# Patient Record
Sex: MALE | Race: WHITE | NOT HISPANIC OR LATINO | Employment: FULL TIME | URBAN - METROPOLITAN AREA
[De-identification: names, ages, dates, MRNs, and addresses within clinical notes are randomized per-mention and may not be internally consistent; named-entity substitution may affect disease eponyms.]

---

## 2017-12-07 DIAGNOSIS — I25.10 CORONARY ARTERY DISEASE, ANGINA PRESENCE UNSPECIFIED, UNSPECIFIED VESSEL OR LESION TYPE, UNSPECIFIED WHETHER NATIVE OR TRANSPLANTED HEART: Primary | ICD-10-CM

## 2017-12-07 DIAGNOSIS — E78.5 DYSLIPIDEMIA: ICD-10-CM

## 2017-12-07 DIAGNOSIS — I21.21 ST ELEVATION MYOCARDIAL INFARCTION INVOLVING LEFT CIRCUMFLEX CORONARY ARTERY: ICD-10-CM

## 2018-02-06 ENCOUNTER — OFFICE VISIT (OUTPATIENT)
Dept: CARDIOLOGY | Facility: CLINIC | Age: 53
End: 2018-02-06
Payer: COMMERCIAL

## 2018-02-06 ENCOUNTER — LAB VISIT (OUTPATIENT)
Dept: LAB | Facility: HOSPITAL | Age: 53
End: 2018-02-06
Attending: INTERNAL MEDICINE
Payer: COMMERCIAL

## 2018-02-06 ENCOUNTER — HOSPITAL ENCOUNTER (OUTPATIENT)
Dept: CARDIOLOGY | Facility: CLINIC | Age: 53
Discharge: HOME | End: 2018-02-06
Attending: INTERNAL MEDICINE
Payer: COMMERCIAL

## 2018-02-06 VITALS
HEART RATE: 59 BPM | BODY MASS INDEX: 23.9 KG/M2 | DIASTOLIC BLOOD PRESSURE: 66 MMHG | HEIGHT: 69 IN | SYSTOLIC BLOOD PRESSURE: 118 MMHG | WEIGHT: 161.38 LBS | OXYGEN SATURATION: 100 %

## 2018-02-06 DIAGNOSIS — I25.10 CORONARY ARTERY DISEASE, ANGINA PRESENCE UNSPECIFIED, UNSPECIFIED VESSEL OR LESION TYPE, UNSPECIFIED WHETHER NATIVE OR TRANSPLANTED HEART: ICD-10-CM

## 2018-02-06 DIAGNOSIS — I21.21 ST ELEVATION MYOCARDIAL INFARCTION INVOLVING LEFT CIRCUMFLEX CORONARY ARTERY: ICD-10-CM

## 2018-02-06 DIAGNOSIS — E78.5 DYSLIPIDEMIA: ICD-10-CM

## 2018-02-06 DIAGNOSIS — I25.118 CORONARY ARTERY DISEASE OF NATIVE ARTERY OF NATIVE HEART WITH STABLE ANGINA PECTORIS: ICD-10-CM

## 2018-02-06 DIAGNOSIS — K21.9 GASTROESOPHAGEAL REFLUX DISEASE WITHOUT ESOPHAGITIS: ICD-10-CM

## 2018-02-06 LAB
CHOLEST SERPL-MCNC: 132 MG/DL
CHOLEST/HDLC SERPL: 3.8 {RATIO}
DIASTOLIC DYSFUNCTION: NO
HDLC SERPL-MCNC: 35 MG/DL
HDLC SERPL: 26.5 %
LDLC SERPL CALC-MCNC: 86.2 MG/DL
MITRAL VALVE REGURGITATION: NORMAL
NONHDLC SERPL-MCNC: 97 MG/DL
RETIRED EF AND QEF - SEE NOTES: 63 (ref 55–65)
TRIGL SERPL-MCNC: 54 MG/DL

## 2018-02-06 PROCEDURE — 99999 PR PBB SHADOW E&M-EST. PATIENT-LVL III: CPT | Mod: PBBFAC,,, | Performed by: INTERNAL MEDICINE

## 2018-02-06 PROCEDURE — 80061 LIPID PANEL: CPT

## 2018-02-06 PROCEDURE — 99499 UNLISTED E&M SERVICE: CPT | Mod: S$GLB,,, | Performed by: INTERNAL MEDICINE

## 2018-02-06 PROCEDURE — 93306 TTE W/DOPPLER COMPLETE: CPT | Mod: S$GLB,,, | Performed by: INTERNAL MEDICINE

## 2018-02-06 PROCEDURE — 36415 COLL VENOUS BLD VENIPUNCTURE: CPT

## 2018-02-06 RX ORDER — OSELTAMIVIR PHOSPHATE 75 MG/1
CAPSULE ORAL
COMMUNITY
Start: 2017-12-21 | End: 2018-08-07

## 2018-02-06 RX ORDER — CLOPIDOGREL BISULFATE 75 MG/1
TABLET ORAL
COMMUNITY
Start: 2017-11-24

## 2018-02-06 RX ORDER — NAPROXEN SODIUM 220 MG/1
81 TABLET, FILM COATED ORAL
COMMUNITY
Start: 2017-11-25

## 2018-02-06 RX ORDER — LISINOPRIL 5 MG/1
TABLET ORAL
COMMUNITY
Start: 2017-11-24

## 2018-02-06 RX ORDER — ATORVASTATIN CALCIUM 80 MG/1
80 TABLET, FILM COATED ORAL
COMMUNITY
Start: 2017-11-25 | End: 2023-12-22

## 2018-02-06 RX ORDER — PANTOPRAZOLE SODIUM 40 MG/1
TABLET, DELAYED RELEASE ORAL
COMMUNITY
Start: 2017-11-24

## 2018-02-06 RX ORDER — ATORVASTATIN CALCIUM 80 MG/1
TABLET, FILM COATED ORAL
COMMUNITY
Start: 2017-11-24 | End: 2018-02-06 | Stop reason: SDUPTHER

## 2018-02-06 NOTE — PROGRESS NOTES
Subjective:    Patient ID:  Rafal Recinos is a 52 y.o. male who presents for follow-up of CAD.      Self Referral    HPI     53 yo HTN, Dyslipidemia, CAD s/p  STEMI (11/22/17) s/p pPCI of mLCx (3.5 x 16 CHRISTINA) [facility], [place] here for follow up.     He reports no symptoms and denies angina/PND/orthopnea or plapitations. No syncope reported. His functional status is > 7 METS (He walks in maintenance for [facility]).      SHx: smokeless Tobacco, No ETOH abuse    FHx: No history of premature CAD.      TTE 2/6/18:  CONCLUSIONS     1 - Normal left ventricular systolic function (EF 60-65%).     2 - No wall motion abnormalities.     3 - Normal left ventricular diastolic function.     4 - Normal right ventricular systolic function .     5 - Trivial mitral regurgitation.     6 - Trivial to mild pulmonic regurgitation    Review of Systems   Constitution: Negative for chills, decreased appetite, fever, weakness, night sweats, weight gain and weight loss.   HENT: Negative for congestion, hoarse voice, stridor and tinnitus.    Eyes: Negative for blurred vision, pain and visual disturbance.   Cardiovascular: Negative for chest pain, claudication, dyspnea on exertion, irregular heartbeat, leg swelling, near-syncope, orthopnea, palpitations, paroxysmal nocturnal dyspnea and syncope.   Respiratory: Negative for cough, hemoptysis, shortness of breath, snoring and wheezing.    Endocrine: Negative for cold intolerance, heat intolerance and polyuria.   Hematologic/Lymphatic: Negative for bleeding problem. Does not bruise/bleed easily.   Skin: Negative for color change and rash.   Musculoskeletal: Negative for arthritis, back pain, joint pain, muscle cramps, myalgias and stiffness.   Gastrointestinal: Negative for abdominal pain, anorexia, constipation, diarrhea, dysphagia, heartburn, hemorrhoids, melena, nausea and vomiting.   Genitourinary: Negative for frequency, hematuria, hesitancy, nocturia and urgency.  "  Neurological: Negative for difficulty with concentration, dizziness, focal weakness, headaches, light-headedness, numbness, seizures, tremors and vertigo.   Psychiatric/Behavioral: Negative for altered mental status and depression. The patient does not have insomnia.    Allergic/Immunologic: Negative for hives.        Objective:    Physical Exam   Constitutional: He is oriented to person, place, and time. He appears well-developed and well-nourished.   HENT:   Head: Normocephalic and atraumatic.   Eyes: Conjunctivae are normal. Pupils are equal, round, and reactive to light.   Neck: Normal range of motion. No JVD present. No tracheal deviation present. No thyromegaly present.   Cardiovascular: Regular rhythm, S1 normal, S2 normal, normal heart sounds, intact distal pulses and normal pulses.   No extrasystoles are present. Exam reveals no S3.    No murmur heard.  Pulses:       Carotid pulses are 2+ on the right side, and 2+ on the left side.       Radial pulses are 2+ on the right side, and 2+ on the left side.        Femoral pulses are 2+ on the right side, and 2+ on the left side.       Dorsalis pedis pulses are 2+ on the right side, and 2+ on the left side.        Posterior tibial pulses are 2+ on the right side, and 2+ on the left side.   Pulmonary/Chest: Effort normal and breath sounds normal. No stridor. No respiratory distress. He has no wheezes. He has no rales.   Abdominal: Soft. Bowel sounds are normal. He exhibits no distension. There is no tenderness.   Musculoskeletal: Normal range of motion. He exhibits no edema or tenderness.   Neurological: He is alert and oriented to person, place, and time.   Skin: Skin is warm and dry. He is not diaphoretic. No erythema.   Psychiatric: He has a normal mood and affect.       /66 (BP Location: Right arm, Patient Position: Sitting, BP Method: Large (Automatic))   Pulse (!) 59   Ht 5' 9" (1.753 m)   Wt 73.2 kg (161 lb 6 oz)   SpO2 100%   BMI 23.83 kg/m² "     Assessment:       1. Coronary artery disease of native artery of native heart without angina    2. Dyslipidemia    3. Gastroesophageal reflux disease without esophagitis      Plan:     Cath films reviewed. Non significant mLAD/distal LCx disease. Patient symptomatic  Would recommend to continue ASA/Plavix for atleast 1 year considering ACS. Continue with high intensity statin therapy.  Echo reviewed which demonstrates normal LVEF. Diet and Exercise modification.   Counseling provided to quit smokeless tobacco.  6 months follow up with Dr. Sarah to reasses/re-evaluate for stress test.     Denny Tovar MD  PGY-7  Interventional Cardiology     I have personally taken the history and examined this patient. I have discussed and agree with the resident's findings and plan as documented in the resident's note.  Bahman Sarah

## 2018-07-31 DIAGNOSIS — E78.5 DYSLIPIDEMIA: ICD-10-CM

## 2018-07-31 DIAGNOSIS — I25.10 CORONARY ARTERY DISEASE, ANGINA PRESENCE UNSPECIFIED, UNSPECIFIED VESSEL OR LESION TYPE, UNSPECIFIED WHETHER NATIVE OR TRANSPLANTED HEART: Primary | ICD-10-CM

## 2018-08-07 ENCOUNTER — HOSPITAL ENCOUNTER (OUTPATIENT)
Dept: CARDIOLOGY | Facility: CLINIC | Age: 53
Discharge: HOME | End: 2018-08-07
Attending: INTERNAL MEDICINE
Payer: COMMERCIAL

## 2018-08-07 ENCOUNTER — OFFICE VISIT (OUTPATIENT)
Dept: CARDIOLOGY | Facility: CLINIC | Age: 53
End: 2018-08-07
Payer: COMMERCIAL

## 2018-08-07 VITALS
HEART RATE: 66 BPM | WEIGHT: 162.94 LBS | SYSTOLIC BLOOD PRESSURE: 133 MMHG | BODY MASS INDEX: 24.13 KG/M2 | OXYGEN SATURATION: 98 % | DIASTOLIC BLOOD PRESSURE: 79 MMHG | HEIGHT: 69 IN

## 2018-08-07 DIAGNOSIS — E78.5 DYSLIPIDEMIA: ICD-10-CM

## 2018-08-07 DIAGNOSIS — I25.10 CORONARY ARTERY DISEASE, ANGINA PRESENCE UNSPECIFIED, UNSPECIFIED VESSEL OR LESION TYPE, UNSPECIFIED WHETHER NATIVE OR TRANSPLANTED HEART: ICD-10-CM

## 2018-08-07 DIAGNOSIS — I10 ESSENTIAL HYPERTENSION: ICD-10-CM

## 2018-08-07 DIAGNOSIS — I25.10 CORONARY ARTERY DISEASE, ANGINA PRESENCE UNSPECIFIED, UNSPECIFIED VESSEL OR LESION TYPE, UNSPECIFIED WHETHER NATIVE OR TRANSPLANTED HEART: Primary | ICD-10-CM

## 2018-08-07 DIAGNOSIS — I25.118 CORONARY ARTERY DISEASE OF NATIVE ARTERY OF NATIVE HEART WITH STABLE ANGINA PECTORIS: Primary | ICD-10-CM

## 2018-08-07 LAB
DIASTOLIC DYSFUNCTION: NO
RETIRED EF AND QEF - SEE NOTES: 60 (ref 55–65)

## 2018-08-07 PROCEDURE — 93351 STRESS TTE COMPLETE: CPT | Mod: S$GLB,,, | Performed by: INTERNAL MEDICINE

## 2018-08-07 PROCEDURE — 99499 UNLISTED E&M SERVICE: CPT | Mod: S$GLB,,, | Performed by: INTERNAL MEDICINE

## 2018-08-07 PROCEDURE — 93321 DOPPLER ECHO F-UP/LMTD STD: CPT | Mod: S$GLB,,, | Performed by: INTERNAL MEDICINE

## 2018-08-07 PROCEDURE — 99999 PR PBB SHADOW E&M-EST. PATIENT-LVL III: CPT | Mod: PBBFAC,,, | Performed by: INTERNAL MEDICINE

## 2018-08-07 RX ORDER — VALACYCLOVIR HYDROCHLORIDE 500 MG/1
500 TABLET, FILM COATED ORAL 3 TIMES DAILY
Refills: 0 | COMMUNITY
Start: 2018-07-31

## 2018-08-07 NOTE — PROGRESS NOTES
Subjective:    Patient ID:  Rafal Recinos is a 53 y.o. male who presents for follow-up of Coronary Artery Disease      HPI    51 yo HTN, Dyslipidemia, CAD s/p  STEMI (11/22/17) s/p pPCI of mLCx (3.5 x 16 CHRISTINA) [facility], [place] here for follow up. He is known to have CAD and last cath showed non-obstructive mLAD and LCX disease. He had exercise stress echo done which showed excellent functional capacity of  15 METS, no ischemic changes on EKG and imaging portion showed normal LV and RV function with good response upon exercise.      He reports no symptoms and denies angina/PND/orthopnea or plapitations. No syncope reported. He is here for         SHx: smokeless Tobacco, No ETOH abuse     FHx: No history of premature CAD.    Review of Systems   Constitution: Negative.   HENT: Negative.    Eyes: Negative.    Cardiovascular: Negative for chest pain, claudication, cyanosis, dyspnea on exertion, irregular heartbeat, leg swelling, near-syncope, orthopnea, palpitations, paroxysmal nocturnal dyspnea and syncope.   Respiratory: Negative.    Endocrine: Negative.    Genitourinary: Negative.         Objective:    Physical Exam   Constitutional: He is oriented to person, place, and time. He appears well-developed and well-nourished.   HENT:   Head: Normocephalic and atraumatic.   Eyes: Conjunctivae are normal.   Neck: Neck supple. No JVD present. No thyromegaly present.   Cardiovascular: Normal rate, regular rhythm, normal heart sounds and intact distal pulses.  Exam reveals no gallop and no friction rub.    No murmur heard.  Pulmonary/Chest: Effort normal and breath sounds normal. No respiratory distress. He has no wheezes. He has no rales.   Abdominal: Soft. Bowel sounds are normal. He exhibits no distension. There is no tenderness. There is no rebound.   Neurological: He is oriented to person, place, and time.   Skin: Skin is warm.   Nursing note and vitals reviewed.        Assessment:       1. Coronary artery  disease of native artery of native heart with stable angina pectoris    2. Dyslipidemia    3. Essential hypertension         Plan:       Essential hypertension  -Controlled today in the office  -Should continue current medical therapy  -Instructed to keep BP log at home and bring it for the next appointment  -Discussed about healthy life style modification including daily exercise 30 min/5 x week, diet (MESH and Mediterranean)improvement according to AHA guidelines. Questions and concerns were properly answered.         Dyslipidemia  -continue with high potency statin     Coronary artery disease involving native coronary artery of native heart  -reviewed results of stress test with the patient  -reassured him about the normal results of the test  -discussed about lifestyle modification and risk factor control     Bryan Crow MD  Interventional Cardiovascular Fellow, PGY VII  Pager: [phone]  8/7/2018 11:14 AM          I have personally taken the history and examined this patient. I have discussed and agree with the resident's findings and plan as documented in the resident's note.  Bahman Sarah

## 2018-08-07 NOTE — ASSESSMENT & PLAN NOTE
-reviewed results of stress test with the patient  -reassured him about the normal results of the test  -discussed about lifestyle modification and risk factor control

## 2019-10-15 ENCOUNTER — LAB VISIT (OUTPATIENT)
Dept: LAB | Facility: HOSPITAL | Age: 54
End: 2019-10-15
Attending: INTERNAL MEDICINE
Payer: COMMERCIAL

## 2019-10-15 ENCOUNTER — OFFICE VISIT (OUTPATIENT)
Dept: CARDIOLOGY | Facility: CLINIC | Age: 54
End: 2019-10-15
Payer: COMMERCIAL

## 2019-10-15 VITALS
BODY MASS INDEX: 24.42 KG/M2 | HEART RATE: 58 BPM | HEIGHT: 69 IN | SYSTOLIC BLOOD PRESSURE: 132 MMHG | OXYGEN SATURATION: 99 % | DIASTOLIC BLOOD PRESSURE: 82 MMHG | WEIGHT: 164.88 LBS

## 2019-10-15 DIAGNOSIS — I25.118 CORONARY ARTERY DISEASE OF NATIVE ARTERY OF NATIVE HEART WITH STABLE ANGINA PECTORIS: Primary | ICD-10-CM

## 2019-10-15 DIAGNOSIS — E78.5 DYSLIPIDEMIA: ICD-10-CM

## 2019-10-15 DIAGNOSIS — E78.5 DYSLIPIDEMIA: Primary | ICD-10-CM

## 2019-10-15 LAB
ALBUMIN SERPL BCP-MCNC: 4 G/DL (ref 3.5–5.2)
ALP SERPL-CCNC: 94 U/L (ref 55–135)
ALT SERPL W/O P-5'-P-CCNC: 31 U/L (ref 10–44)
ANION GAP SERPL CALC-SCNC: 6 MMOL/L (ref 8–16)
AST SERPL-CCNC: 25 U/L (ref 10–40)
BASOPHILS # BLD AUTO: 0.02 K/UL (ref 0–0.2)
BASOPHILS NFR BLD: 0.3 % (ref 0–1.9)
BILIRUB SERPL-MCNC: 0.7 MG/DL (ref 0.1–1)
BUN SERPL-MCNC: 10 MG/DL (ref 6–20)
CALCIUM SERPL-MCNC: 9.9 MG/DL (ref 8.7–10.5)
CHLORIDE SERPL-SCNC: 105 MMOL/L (ref 95–110)
CHOLEST SERPL-MCNC: 136 MG/DL (ref 120–199)
CHOLEST/HDLC SERPL: 3.8 {RATIO} (ref 2–5)
CO2 SERPL-SCNC: 29 MMOL/L (ref 23–29)
CREAT SERPL-MCNC: 1.2 MG/DL (ref 0.5–1.4)
DIFFERENTIAL METHOD: ABNORMAL
EOSINOPHIL # BLD AUTO: 0 K/UL (ref 0–0.5)
EOSINOPHIL NFR BLD: 0 % (ref 0–8)
ERYTHROCYTE [DISTWIDTH] IN BLOOD BY AUTOMATED COUNT: 12.2 % (ref 11.5–14.5)
EST. GFR  (AFRICAN AMERICAN): >60 ML/MIN/1.73 M^2
EST. GFR  (NON AFRICAN AMERICAN): >60 ML/MIN/1.73 M^2
GLUCOSE SERPL-MCNC: 102 MG/DL (ref 70–110)
HCT VFR BLD AUTO: 47.1 % (ref 40–54)
HDLC SERPL-MCNC: 36 MG/DL (ref 40–75)
HDLC SERPL: 26.5 % (ref 20–50)
HGB BLD-MCNC: 15.4 G/DL (ref 14–18)
IMM GRANULOCYTES # BLD AUTO: 0.01 K/UL (ref 0–0.04)
IMM GRANULOCYTES NFR BLD AUTO: 0.2 % (ref 0–0.5)
LDLC SERPL CALC-MCNC: 90.4 MG/DL (ref 63–159)
LYMPHOCYTES # BLD AUTO: 1.6 K/UL (ref 1–4.8)
LYMPHOCYTES NFR BLD: 27.3 % (ref 18–48)
MCH RBC QN AUTO: 31.2 PG (ref 27–31)
MCHC RBC AUTO-ENTMCNC: 32.7 G/DL (ref 32–36)
MCV RBC AUTO: 96 FL (ref 82–98)
MONOCYTES # BLD AUTO: 0.6 K/UL (ref 0.3–1)
MONOCYTES NFR BLD: 10 % (ref 4–15)
NEUTROPHILS # BLD AUTO: 3.7 K/UL (ref 1.8–7.7)
NEUTROPHILS NFR BLD: 62.2 % (ref 38–73)
NONHDLC SERPL-MCNC: 100 MG/DL
NRBC BLD-RTO: 0 /100 WBC
PLATELET # BLD AUTO: 217 K/UL (ref 150–350)
PMV BLD AUTO: 10.2 FL (ref 9.2–12.9)
POTASSIUM SERPL-SCNC: 4.4 MMOL/L (ref 3.5–5.1)
PROT SERPL-MCNC: 7.1 G/DL (ref 6–8.4)
RBC # BLD AUTO: 4.93 M/UL (ref 4.6–6.2)
SODIUM SERPL-SCNC: 140 MMOL/L (ref 136–145)
TRIGL SERPL-MCNC: 48 MG/DL (ref 30–150)
WBC # BLD AUTO: 6 K/UL (ref 3.9–12.7)

## 2019-10-15 PROCEDURE — 3008F PR BODY MASS INDEX (BMI) DOCUMENTED: ICD-10-PCS | Mod: CPTII,S$GLB,, | Performed by: INTERNAL MEDICINE

## 2019-10-15 PROCEDURE — 3075F PR MOST RECENT SYSTOLIC BLOOD PRESS GE 130-139MM HG: ICD-10-PCS | Mod: CPTII,S$GLB,, | Performed by: INTERNAL MEDICINE

## 2019-10-15 PROCEDURE — 80061 LIPID PANEL: CPT

## 2019-10-15 PROCEDURE — 3079F DIAST BP 80-89 MM HG: CPT | Mod: CPTII,S$GLB,, | Performed by: INTERNAL MEDICINE

## 2019-10-15 PROCEDURE — 99999 PR PBB SHADOW E&M-EST. PATIENT-LVL III: ICD-10-PCS | Mod: PBBFAC,,, | Performed by: INTERNAL MEDICINE

## 2019-10-15 PROCEDURE — 99999 PR PBB SHADOW E&M-EST. PATIENT-LVL III: CPT | Mod: PBBFAC,,, | Performed by: INTERNAL MEDICINE

## 2019-10-15 PROCEDURE — 36415 COLL VENOUS BLD VENIPUNCTURE: CPT

## 2019-10-15 PROCEDURE — 85025 COMPLETE CBC W/AUTO DIFF WBC: CPT

## 2019-10-15 PROCEDURE — 3008F BODY MASS INDEX DOCD: CPT | Mod: CPTII,S$GLB,, | Performed by: INTERNAL MEDICINE

## 2019-10-15 PROCEDURE — 99215 OFFICE O/P EST HI 40 MIN: CPT | Mod: S$GLB,,, | Performed by: INTERNAL MEDICINE

## 2019-10-15 PROCEDURE — 99215 PR OFFICE/OUTPT VISIT, EST, LEVL V, 40-54 MIN: ICD-10-PCS | Mod: S$GLB,,, | Performed by: INTERNAL MEDICINE

## 2019-10-15 PROCEDURE — 80053 COMPREHEN METABOLIC PANEL: CPT

## 2019-10-15 PROCEDURE — 3075F SYST BP GE 130 - 139MM HG: CPT | Mod: CPTII,S$GLB,, | Performed by: INTERNAL MEDICINE

## 2019-10-15 PROCEDURE — 3079F PR MOST RECENT DIASTOLIC BLOOD PRESSURE 80-89 MM HG: ICD-10-PCS | Mod: CPTII,S$GLB,, | Performed by: INTERNAL MEDICINE

## 2019-10-15 NOTE — PROGRESS NOTES
Interventional Cardiology Clinic Note  Reason for Visit: CAD    HPI:   51 yo M here to follow up on his CAD.    Had a STEMI (17) s/p pPCI of mLCx (3.5 x 16 CHRISTINA) [facility], [place]. He is known to have CAD and last cath showed non-obstructive mLAD and LCX disease. He had exercise stress echo done 2018 which showed excellent functional capacity of  15 METS, no ischemic changes on EKG and imaging portion showed normal LV and RV function with good Bp response upon exercise.      He reports no symptoms and denies angina/PND/orthopnea or plapitations. No syncope reported. He walks 4-5 miles daily and climbs multiple flights of stairs without dyspnea, or angina. No complaints.        SHx: never smoked      FHx: Father  in his 60's from MI  On ASA lipitor and lisinopril, resting heart rate 58 bpm not on beta blockers     ROS:    Review of Systems   All other systems reviewed and are negative.    PMH:   History reviewed. No pertinent past medical history.  History reviewed. No pertinent surgical history.  Allergies:   Review of patient's allergies indicates:  No Known Allergies  Medications:     Current Outpatient Medications on File Prior to Visit   Medication Sig Dispense Refill    aspirin 81 MG Chew Take 81 mg by mouth.      atorvastatin (LIPITOR) 80 MG tablet Take 80 mg by mouth.      lisinopril (PRINIVIL,ZESTRIL) 5 MG tablet       multivitamin capsule Take 1 capsule by mouth once daily.      pantoprazole (PROTONIX) 40 MG tablet       clopidogrel (PLAVIX) 75 mg tablet       valACYclovir (VALTREX) 500 MG tablet Take 500 mg by mouth 3 (three) times daily.  0     No current facility-administered medications on file prior to visit.      Social History:     Social History     Tobacco Use    Smoking status: Never Smoker    Smokeless tobacco: Current User     Types: Chew   Substance Use Topics    Alcohol use: No     Family History:     Family History   Problem Relation Age of Onset    Heart  "attack Father      Physical Exam:   /82 (BP Location: Right arm, Patient Position: Sitting, BP Method: Large (Automatic))   Pulse (!) 58   Ht 5' 9" (1.753 m)   Wt 74.8 kg (164 lb 14.5 oz)   SpO2 99%   BMI 24.35 kg/m²      Physical Exam   Constitutional: He is oriented to person, place, and time. He appears well-developed and well-nourished. No distress.   HENT:   Head: Normocephalic and atraumatic.   Eyes: No scleral icterus.   Cardiovascular: Normal rate, regular rhythm and normal heart sounds.   No murmur heard.  Pulmonary/Chest: Effort normal and breath sounds normal.   Musculoskeletal: He exhibits no edema.   Neurological: He is alert and oriented to person, place, and time.   Skin: Skin is warm. He is not diaphoretic.   Psychiatric: He has a normal mood and affect.       Labs:     Lab Results   Component Value Date     10/15/2019    K 4.4 10/15/2019     10/15/2019    CO2 29 10/15/2019    BUN 10 10/15/2019    CREATININE 1.2 10/15/2019    ANIONGAP 6 (L) 10/15/2019     No results found for: HGBA1C  No results found for: BNP, BNPTRIAGEBLO Lab Results   Component Value Date    WBC 6.00 10/15/2019    HGB 15.4 10/15/2019    HCT 47.1 10/15/2019     10/15/2019    GRAN 3.7 10/15/2019    GRAN 62.2 10/15/2019     Lab Results   Component Value Date    CHOL 136 10/15/2019    HDL 36 (L) 10/15/2019    LDLCALC 90.4 10/15/2019    TRIG 48 10/15/2019          Imaging:     Assessment:      1. Coronary artery disease of native artery of native heart with stable angina pectoris      He is doing well, excellent exercise tolerance, no angina or anginal equivalents. LDL is at goal of <100 on high intensity statin Anthony <130.    Plan:   Rafal was seen today for coronary artery disease.    Diagnoses and all orders for this visit:    Coronary artery disease of native artery of native heart with stable angina pectoris      -Continue ASA 81mg indefinitely   -High intensity lipitor 80mg daily   -Continue " Lisinopril 5mg daily   -Encouraged to continue exercise   -LDL < 100    He prefers to follow up with a  Cardiologist closer to home     Will follow with us PRN    Signed:  Azael Milligan, DO  Cardiology Fellow      I have personally taken the history and examined this patient. I have discussed and agree with the resident's findings and plan as documented in the resident's note.  Mr. Romeo is a 54-year-old male with a heavy family history of coronary disease, hypercholesterolemia, who suffered an acute myocardial infarction at the end of 2017 treated with emergency angioplasty at [facility].  I have seen him in follow-up since that time.  He has remained asymptomatic has his cholesterol controlled on high-intensity statins and is completely asymptomatic.  He exercises and walks at least 5 miles on a daily basis with no symptoms.  He is also looking for a follow-up primary cardiologist closer to home which is [facility].  I have recommended he follow-up with Dr. Boggs in [facility] and will forward this note to Dr. Boggs his office.  Bahman Sarah

## 2022-12-05 DIAGNOSIS — I25.10 CORONARY ARTERY DISEASE INVOLVING NATIVE CORONARY ARTERY OF NATIVE HEART WITHOUT ANGINA PECTORIS: Primary | ICD-10-CM

## 2022-12-20 ENCOUNTER — OFFICE VISIT (OUTPATIENT)
Dept: CARDIOLOGY | Facility: CLINIC | Age: 57
End: 2022-12-20
Payer: COMMERCIAL

## 2022-12-20 ENCOUNTER — HOSPITAL ENCOUNTER (OUTPATIENT)
Dept: CARDIOLOGY | Facility: HOSPITAL | Age: 57
Discharge: HOME | End: 2022-12-20
Attending: INTERNAL MEDICINE
Payer: COMMERCIAL

## 2022-12-20 VITALS
HEIGHT: 67 IN | WEIGHT: 160.25 LBS | DIASTOLIC BLOOD PRESSURE: 87 MMHG | OXYGEN SATURATION: 99 % | HEART RATE: 60 BPM | BODY MASS INDEX: 25.15 KG/M2 | SYSTOLIC BLOOD PRESSURE: 136 MMHG

## 2022-12-20 VITALS — HEIGHT: 69 IN | WEIGHT: 165 LBS | BODY MASS INDEX: 24.44 KG/M2

## 2022-12-20 DIAGNOSIS — I25.10 CORONARY ARTERY DISEASE INVOLVING NATIVE CORONARY ARTERY OF NATIVE HEART WITHOUT ANGINA PECTORIS: Primary | ICD-10-CM

## 2022-12-20 DIAGNOSIS — E78.5 HYPERLIPIDEMIA, UNSPECIFIED HYPERLIPIDEMIA TYPE: ICD-10-CM

## 2022-12-20 DIAGNOSIS — I25.10 CORONARY ARTERY DISEASE INVOLVING NATIVE CORONARY ARTERY OF NATIVE HEART WITHOUT ANGINA PECTORIS: ICD-10-CM

## 2022-12-20 LAB
ASCENDING AORTA: 2.77 CM
AV INDEX (PROSTH): 0.92
AV MEAN GRADIENT: 2 MMHG
AV PEAK GRADIENT: 4 MMHG
AV VALVE AREA: 3.29 CM2
AV VELOCITY RATIO: 0.9
BSA FOR ECHO PROCEDURE: 1.91 M2
CV ECHO LV RWT: 0.28 CM
CV STRESS BASE HR: 51 BPM
DIASTOLIC BLOOD PRESSURE: 83 MMHG
DOP CALC AO PEAK VEL: 0.96 M/S
DOP CALC AO VTI: 19.38 CM
DOP CALC LVOT AREA: 3.6 CM2
DOP CALC LVOT DIAMETER: 2.13 CM
DOP CALC LVOT PEAK VEL: 0.86 M/S
DOP CALC LVOT STROKE VOLUME: 63.82 CM3
DOP CALCLVOT PEAK VEL VTI: 17.92 CM
E WAVE DECELERATION TIME: 164.69 MSEC
E/A RATIO: 1.74
E/E' RATIO: 7.3 M/S
ECHO LV POSTERIOR WALL: 0.69 CM (ref 0.6–1.1)
EJECTION FRACTION: 52 %
FRACTIONAL SHORTENING: 22 % (ref 28–44)
INTERVENTRICULAR SEPTUM: 0.86 CM (ref 0.6–1.1)
IVRT: 97.05 MSEC
LA MAJOR: 4.69 CM
LA MINOR: 5.56 CM
LA WIDTH: 4.43 CM
LEFT ATRIUM SIZE: 3.29 CM
LEFT ATRIUM VOLUME INDEX: 33.2 ML/M2
LEFT ATRIUM VOLUME: 63.03 CM3
LEFT INTERNAL DIMENSION IN SYSTOLE: 3.83 CM (ref 2.1–4)
LEFT VENTRICLE DIASTOLIC VOLUME INDEX: 60.63 ML/M2
LEFT VENTRICLE DIASTOLIC VOLUME: 115.2 ML
LEFT VENTRICLE MASS INDEX: 67 G/M2
LEFT VENTRICLE SYSTOLIC VOLUME INDEX: 33.2 ML/M2
LEFT VENTRICLE SYSTOLIC VOLUME: 63.11 ML
LEFT VENTRICULAR INTERNAL DIMENSION IN DIASTOLE: 4.94 CM (ref 3.5–6)
LEFT VENTRICULAR MASS: 127.73 G
LV LATERAL E/E' RATIO: 6.08 M/S
LV SEPTAL E/E' RATIO: 9.13 M/S
MV A" WAVE DURATION": 9.99 MSEC
MV PEAK A VEL: 0.42 M/S
MV PEAK E VEL: 0.73 M/S
MV STENOSIS PRESSURE HALF TIME: 47.76 MS
MV VALVE AREA P 1/2 METHOD: 4.61 CM2
OHS CV CPX 1 MINUTE RECOVERY HEART RATE: 116 BPM
OHS CV CPX 85 PERCENT MAX PREDICTED HEART RATE MALE: 139
OHS CV CPX ESTIMATED METS: 13
OHS CV CPX MAX PREDICTED HEART RATE: 163
OHS CV CPX PATIENT IS FEMALE: 0
OHS CV CPX PATIENT IS MALE: 1
OHS CV CPX PEAK DIASTOLIC BLOOD PRESSURE: 73 MMHG
OHS CV CPX PEAK HEAR RATE: 155 BPM
OHS CV CPX PEAK RATE PRESSURE PRODUCT: NORMAL
OHS CV CPX PEAK SYSTOLIC BLOOD PRESSURE: 180 MMHG
OHS CV CPX PERCENT MAX PREDICTED HEART RATE ACHIEVED: 95
OHS CV CPX RATE PRESSURE PRODUCT PRESENTING: 7140
PISA TR MAX VEL: 1.95 M/S
PULM VEIN S/D RATIO: 0.56
PV PEAK D VEL: 0.43 M/S
PV PEAK S VEL: 0.24 M/S
RA MAJOR: 4.11 CM
RA PRESSURE: 8 MMHG
RA WIDTH: 3.9 CM
RIGHT VENTRICULAR END-DIASTOLIC DIMENSION: 3.54 CM
RV TISSUE DOPPLER FREE WALL SYSTOLIC VELOCITY 1 (APICAL 4 CHAMBER VIEW): 10.94 CM/S
SINUS: 3.15 CM
STJ: 2.62 CM
STRESS ECHO POST EXERCISE DUR MIN: 7 MINUTES
STRESS ECHO POST EXERCISE DUR SEC: 30 SECONDS
SYSTOLIC BLOOD PRESSURE: 140 MMHG
TDI LATERAL: 0.12 M/S
TDI SEPTAL: 0.08 M/S
TDI: 0.1 M/S
TR MAX PG: 15 MMHG
TRICUSPID ANNULAR PLANE SYSTOLIC EXCURSION: 2.48 CM
TV REST PULMONARY ARTERY PRESSURE: 23 MMHG

## 2022-12-20 PROCEDURE — 4010F PR ACE/ARB THEARPY RXD/TAKEN: ICD-10-PCS | Mod: CPTII,S$GLB,, | Performed by: INTERNAL MEDICINE

## 2022-12-20 PROCEDURE — 93325 STRESS ECHO (CUPID ONLY): ICD-10-PCS | Mod: 26,,, | Performed by: INTERNAL MEDICINE

## 2022-12-20 PROCEDURE — 3079F PR MOST RECENT DIASTOLIC BLOOD PRESSURE 80-89 MM HG: ICD-10-PCS | Mod: CPTII,S$GLB,, | Performed by: INTERNAL MEDICINE

## 2022-12-20 PROCEDURE — 3075F SYST BP GE 130 - 139MM HG: CPT | Mod: CPTII,S$GLB,, | Performed by: INTERNAL MEDICINE

## 2022-12-20 PROCEDURE — 3008F BODY MASS INDEX DOCD: CPT | Mod: CPTII,S$GLB,, | Performed by: INTERNAL MEDICINE

## 2022-12-20 PROCEDURE — 4010F ACE/ARB THERAPY RXD/TAKEN: CPT | Mod: CPTII,S$GLB,, | Performed by: INTERNAL MEDICINE

## 2022-12-20 PROCEDURE — 3075F PR MOST RECENT SYSTOLIC BLOOD PRESS GE 130-139MM HG: ICD-10-PCS | Mod: CPTII,S$GLB,, | Performed by: INTERNAL MEDICINE

## 2022-12-20 PROCEDURE — 1159F PR MEDICATION LIST DOCUMENTED IN MEDICAL RECORD: ICD-10-PCS | Mod: CPTII,S$GLB,, | Performed by: INTERNAL MEDICINE

## 2022-12-20 PROCEDURE — 93320 STRESS ECHO (CUPID ONLY): ICD-10-PCS | Mod: 26,,, | Performed by: INTERNAL MEDICINE

## 2022-12-20 PROCEDURE — 99999 PR PBB SHADOW E&M-EST. PATIENT-LVL IV: ICD-10-PCS | Mod: PBBFAC,,, | Performed by: INTERNAL MEDICINE

## 2022-12-20 PROCEDURE — 3008F PR BODY MASS INDEX (BMI) DOCUMENTED: ICD-10-PCS | Mod: CPTII,S$GLB,, | Performed by: INTERNAL MEDICINE

## 2022-12-20 PROCEDURE — 93351 STRESS ECHO (CUPID ONLY): ICD-10-PCS | Mod: 26,,, | Performed by: INTERNAL MEDICINE

## 2022-12-20 PROCEDURE — 1159F MED LIST DOCD IN RCRD: CPT | Mod: CPTII,S$GLB,, | Performed by: INTERNAL MEDICINE

## 2022-12-20 PROCEDURE — 93351 STRESS TTE COMPLETE: CPT | Mod: 26,,, | Performed by: INTERNAL MEDICINE

## 2022-12-20 PROCEDURE — 93351 STRESS TTE COMPLETE: CPT

## 2022-12-20 PROCEDURE — 3079F DIAST BP 80-89 MM HG: CPT | Mod: CPTII,S$GLB,, | Performed by: INTERNAL MEDICINE

## 2022-12-20 PROCEDURE — 99999 PR PBB SHADOW E&M-EST. PATIENT-LVL IV: CPT | Mod: PBBFAC,,, | Performed by: INTERNAL MEDICINE

## 2022-12-20 PROCEDURE — 93325 DOPPLER ECHO COLOR FLOW MAPG: CPT | Mod: 26,,, | Performed by: INTERNAL MEDICINE

## 2022-12-20 PROCEDURE — 1160F RVW MEDS BY RX/DR IN RCRD: CPT | Mod: CPTII,S$GLB,, | Performed by: INTERNAL MEDICINE

## 2022-12-20 PROCEDURE — 1160F PR REVIEW ALL MEDS BY PRESCRIBER/CLIN PHARMACIST DOCUMENTED: ICD-10-PCS | Mod: CPTII,S$GLB,, | Performed by: INTERNAL MEDICINE

## 2022-12-20 PROCEDURE — 93320 DOPPLER ECHO COMPLETE: CPT | Mod: 26,,, | Performed by: INTERNAL MEDICINE

## 2022-12-20 PROCEDURE — 99499 UNLISTED E&M SERVICE: CPT | Mod: S$GLB,,, | Performed by: INTERNAL MEDICINE

## 2022-12-20 PROCEDURE — 99499 NO LOS: ICD-10-PCS | Mod: S$GLB,,, | Performed by: INTERNAL MEDICINE

## 2022-12-20 NOTE — PROGRESS NOTES
Clinic Note  12/20/2022      Subjective:       Patient ID:  KUMAR is a 57 y.o. male being seen for an established visit.    Chief Complaint: No chief complaint on file.    HPI  Patient is a 56 yo M with h/o CAD (STEMI (11/22/17) s/p pPCI of mLCx (3.5 x 16 CHRISTINA) [place], [place]). He is known to have CAD and last cath showed non-obstructive mLAD and LCX disease. He had exercise stress echo done 2018 which showed excellent functional capacity of 15 METS, no ischemic changes on EKG and imaging portion showed normal LV and RV function with good Bp response upon exercise. Last seen by Dr. Sarah in clinic on 10/15/19.     Lifetime nonsmoker, drinks alcohol occasinally. Denies illicit drug use.    Repeat stress test on 12/20/22:  EF 52%, stress echo negative for myocardial ischemia.    Review of Systems   Constitutional:  Negative for chills, fever and weight loss.   HENT: Negative.     Eyes:  Negative for blurred vision.   Respiratory:  Negative for cough and shortness of breath.    Cardiovascular:  Negative for chest pain and palpitations.   Gastrointestinal:  Negative for abdominal pain, blood in stool, constipation, diarrhea, melena, nausea and vomiting.   Musculoskeletal:  Negative for myalgias.   Skin: Negative.    Neurological:  Negative for dizziness, loss of consciousness and weakness.     No past medical history on file.    Family History   Problem Relation Age of Onset    Heart attack Father         reports that he has never smoked. His smokeless tobacco use includes chew. He reports that he does not drink alcohol.    Medication List with Changes/Refills   Current Medications    ASPIRIN 81 MG CHEW    Take 81 mg by mouth.    ATORVASTATIN (LIPITOR) 80 MG TABLET    Take 80 mg by mouth.    CLOPIDOGREL (PLAVIX) 75 MG TABLET        LISINOPRIL (PRINIVIL,ZESTRIL) 5 MG TABLET        MULTIVITAMIN CAPSULE    Take 1 capsule by mouth once daily.    PANTOPRAZOLE (PROTONIX) 40 MG TABLET        VALACYCLOVIR  "(VALTREX) 500 MG TABLET    Take 500 mg by mouth 3 (three) times daily.     Review of patient's allergies indicates:  No Known Allergies    Patient Active Problem List   Diagnosis    Coronary artery disease involving native coronary artery of native heart    Dyslipidemia    Gastroesophageal reflux disease without esophagitis    Essential hypertension           Objective:      There were no vitals taken for this visit.  Estimated body mass index is 24.35 kg/m² as calculated from the following:    Height as of 10/15/19: 5' 9" (1.753 m).    Weight as of 10/15/19: 74.8 kg (164 lb 14.5 oz).  Physical Exam  Constitutional:       General: He is not in acute distress.     Appearance: He is not diaphoretic.   HENT:      Head: Normocephalic and atraumatic.      Mouth/Throat:      Pharynx: No oropharyngeal exudate.   Eyes:      General: No scleral icterus.     Conjunctiva/sclera: Conjunctivae normal.      Pupils: Pupils are equal, round, and reactive to light.   Neck:      Vascular: No JVD.      Trachea: No tracheal deviation.   Cardiovascular:      Rate and Rhythm: Normal rate and regular rhythm.      Heart sounds: No murmur heard.    No friction rub. No gallop.   Pulmonary:      Effort: Pulmonary effort is normal. No respiratory distress.      Breath sounds: Normal breath sounds. No wheezing.   Chest:      Chest wall: No tenderness.   Abdominal:      General: Bowel sounds are normal. There is no distension.      Tenderness: There is no abdominal tenderness. There is no rebound.   Musculoskeletal:         General: No deformity. Normal range of motion.      Cervical back: Normal range of motion and neck supple.   Lymphadenopathy:      Cervical: No cervical adenopathy.   Skin:     General: Skin is warm and dry.   Neurological:      Mental Status: He is alert and oriented to person, place, and time.      Cranial Nerves: No cranial nerve deficit.   Psychiatric:         Mood and Affect: Affect normal.         Assessment and Plan: "         Diagnoses and all orders for this visit:    Coronary artery disease involving native coronary artery of native heart without angina pectoris        No follow-ups on file.    Other Orders Placed This Visit:  No orders of the defined types were placed in this encounter.        Richardson Murguia      I have seen the patient, reviewed the Fellow's history and physical, assessment and plan. I have personally interviewed and examined the patient and agree with the findings.  Patient is asymptomatic, had normal stress test with no ischemia and has a history of percutaneous stent in [facility] for STEMI.  His ejection fraction remains normal and he is active.  Would recommend discontinuing statins as patient has not taken them due to side effects and have ordered Repatha.  Follow-up in 1 year.    LUIS Sarah MD

## 2022-12-22 DIAGNOSIS — I25.10 CORONARY ARTERY DISEASE INVOLVING NATIVE CORONARY ARTERY OF NATIVE HEART WITHOUT ANGINA PECTORIS: Primary | ICD-10-CM

## 2023-12-22 ENCOUNTER — OFFICE VISIT (OUTPATIENT)
Dept: CARDIOLOGY | Facility: CLINIC | Age: 58
End: 2023-12-22
Payer: COMMERCIAL

## 2023-12-22 ENCOUNTER — HOSPITAL ENCOUNTER (OUTPATIENT)
Dept: CARDIOLOGY | Facility: HOSPITAL | Age: 58
Discharge: HOME | End: 2023-12-22
Attending: INTERNAL MEDICINE
Payer: COMMERCIAL

## 2023-12-22 VITALS
BODY MASS INDEX: 24.09 KG/M2 | SYSTOLIC BLOOD PRESSURE: 124 MMHG | HEIGHT: 69 IN | OXYGEN SATURATION: 98 % | WEIGHT: 162.69 LBS | DIASTOLIC BLOOD PRESSURE: 82 MMHG | HEART RATE: 73 BPM

## 2023-12-22 DIAGNOSIS — I25.10 CORONARY ARTERY DISEASE INVOLVING NATIVE CORONARY ARTERY OF NATIVE HEART WITHOUT ANGINA PECTORIS: Primary | ICD-10-CM

## 2023-12-22 DIAGNOSIS — I10 HYPERTENSION, UNSPECIFIED TYPE: ICD-10-CM

## 2023-12-22 DIAGNOSIS — E78.5 HYPERLIPIDEMIA, UNSPECIFIED HYPERLIPIDEMIA TYPE: ICD-10-CM

## 2023-12-22 DIAGNOSIS — I25.10 CORONARY ARTERY DISEASE INVOLVING NATIVE CORONARY ARTERY OF NATIVE HEART WITHOUT ANGINA PECTORIS: ICD-10-CM

## 2023-12-22 DIAGNOSIS — E78.01 FAMILIAL HYPERCHOLESTEROLEMIA: ICD-10-CM

## 2023-12-22 LAB
ASCENDING AORTA: 2.92 CM
AV INDEX (PROSTH): 0.72
AV MEAN GRADIENT: 3 MMHG
AV PEAK GRADIENT: 5 MMHG
AV VALVE AREA BY VELOCITY RATIO: 2.44 CM²
AV VALVE AREA: 2.46 CM²
AV VELOCITY RATIO: 0.71
CV ECHO LV RWT: 0.34 CM
CV STRESS BASE HR: 60 BPM
DIASTOLIC BLOOD PRESSURE: 71 MMHG
DOP CALC AO PEAK VEL: 1.11 M/S
DOP CALC AO VTI: 24.38 CM
DOP CALC LVOT AREA: 3.4 CM2
DOP CALC LVOT DIAMETER: 2.09 CM
DOP CALC LVOT PEAK VEL: 0.79 M/S
DOP CALC LVOT STROKE VOLUME: 59.94 CM3
DOP CALCLVOT PEAK VEL VTI: 17.48 CM
E WAVE DECELERATION TIME: 260.22 MSEC
E/A RATIO: 1.1
E/E' RATIO: 5.18 M/S
ECHO LV POSTERIOR WALL: 0.8 CM (ref 0.6–1.1)
FRACTIONAL SHORTENING: 25 % (ref 28–44)
INTERVENTRICULAR SEPTUM: 0.83 CM (ref 0.6–1.1)
IVRT: 95.15 MSEC
LA MAJOR: 4.52 CM
LA MINOR: 5.39 CM
LA WIDTH: 4.12 CM
LEFT ATRIUM SIZE: 3.3 CM
LEFT ATRIUM VOLUME: 56.82 CM3
LEFT INTERNAL DIMENSION IN SYSTOLE: 3.53 CM (ref 2.1–4)
LEFT VENTRICLE DIASTOLIC VOLUME: 101.33 ML
LEFT VENTRICLE SYSTOLIC VOLUME: 51.78 ML
LEFT VENTRICULAR INTERNAL DIMENSION IN DIASTOLE: 4.68 CM (ref 3.5–6)
LEFT VENTRICULAR MASS: 124.35 G
LV LATERAL E/E' RATIO: 4.38 M/S
LV SEPTAL E/E' RATIO: 6.33 M/S
MV A" WAVE DURATION": 13.32 MSEC
MV PEAK A VEL: 0.52 M/S
MV PEAK E VEL: 0.57 M/S
MV STENOSIS PRESSURE HALF TIME: 75.46 MS
MV VALVE AREA P 1/2 METHOD: 2.92 CM2
OHS CV CPX 1 MINUTE RECOVERY HEART RATE: 110 BPM
OHS CV CPX 85 PERCENT MAX PREDICTED HEART RATE MALE: 138
OHS CV CPX ESTIMATED METS: 13
OHS CV CPX MAX PREDICTED HEART RATE: 162
OHS CV CPX PATIENT IS FEMALE: 0
OHS CV CPX PATIENT IS MALE: 1
OHS CV CPX PEAK DIASTOLIC BLOOD PRESSURE: 74 MMHG
OHS CV CPX PEAK HEAR RATE: 144 BPM
OHS CV CPX PEAK RATE PRESSURE PRODUCT: ABNORMAL
OHS CV CPX PEAK SYSTOLIC BLOOD PRESSURE: 181 MMHG
OHS CV CPX PERCENT MAX PREDICTED HEART RATE ACHIEVED: 89
OHS CV CPX RATE PRESSURE PRODUCT PRESENTING: 7320
PISA TR MAX VEL: 2.32 M/S
PULM VEIN S/D RATIO: 0.64
PV PEAK D VEL: 0.45 M/S
PV PEAK S VEL: 0.29 M/S
RA MAJOR: 4.52 CM
RA PRESSURE ESTIMATED: 3 MMHG
RA WIDTH: 3.85 CM
RIGHT VENTRICULAR END-DIASTOLIC DIMENSION: 3.5 CM
RV TB RVSP: 5 MMHG
SINUS: 3.14 CM
STJ: 2.81 CM
STRESS ECHO POST EXERCISE DUR MIN: 8 MINUTES
SYSTOLIC BLOOD PRESSURE: 122 MMHG
TDI LATERAL: 0.13 M/S
TDI SEPTAL: 0.09 M/S
TDI: 0.11 M/S
TR MAX PG: 22 MMHG
TRICUSPID ANNULAR PLANE SYSTOLIC EXCURSION: 2.13 CM
TV REST PULMONARY ARTERY PRESSURE: 25 MMHG

## 2023-12-22 PROCEDURE — 1160F RVW MEDS BY RX/DR IN RCRD: CPT | Mod: CPTII,S$GLB,, | Performed by: INTERNAL MEDICINE

## 2023-12-22 PROCEDURE — 3079F DIAST BP 80-89 MM HG: CPT | Mod: CPTII,S$GLB,, | Performed by: INTERNAL MEDICINE

## 2023-12-22 PROCEDURE — 3079F PR MOST RECENT DIASTOLIC BLOOD PRESSURE 80-89 MM HG: ICD-10-PCS | Mod: CPTII,S$GLB,, | Performed by: INTERNAL MEDICINE

## 2023-12-22 PROCEDURE — 93351 STRESS ECHO (CUPID ONLY): ICD-10-PCS | Mod: 26,,, | Performed by: INTERNAL MEDICINE

## 2023-12-22 PROCEDURE — 1159F PR MEDICATION LIST DOCUMENTED IN MEDICAL RECORD: ICD-10-PCS | Mod: CPTII,S$GLB,, | Performed by: INTERNAL MEDICINE

## 2023-12-22 PROCEDURE — 93351 STRESS TTE COMPLETE: CPT

## 2023-12-22 PROCEDURE — 4010F ACE/ARB THERAPY RXD/TAKEN: CPT | Mod: CPTII,S$GLB,, | Performed by: INTERNAL MEDICINE

## 2023-12-22 PROCEDURE — 93320 DOPPLER ECHO COMPLETE: CPT | Mod: 26,,, | Performed by: INTERNAL MEDICINE

## 2023-12-22 PROCEDURE — 99214 PR OFFICE/OUTPT VISIT, EST, LEVL IV, 30-39 MIN: ICD-10-PCS | Mod: S$GLB,,, | Performed by: INTERNAL MEDICINE

## 2023-12-22 PROCEDURE — 99999 PR PBB SHADOW E&M-EST. PATIENT-LVL IV: ICD-10-PCS | Mod: PBBFAC,,, | Performed by: INTERNAL MEDICINE

## 2023-12-22 PROCEDURE — 93320 DOPPLER ECHO COMPLETE: CPT

## 2023-12-22 PROCEDURE — 3008F BODY MASS INDEX DOCD: CPT | Mod: CPTII,S$GLB,, | Performed by: INTERNAL MEDICINE

## 2023-12-22 PROCEDURE — 93351 STRESS TTE COMPLETE: CPT | Mod: 26,,, | Performed by: INTERNAL MEDICINE

## 2023-12-22 PROCEDURE — 3044F HG A1C LEVEL LT 7.0%: CPT | Mod: CPTII,S$GLB,, | Performed by: INTERNAL MEDICINE

## 2023-12-22 PROCEDURE — 1160F PR REVIEW ALL MEDS BY PRESCRIBER/CLIN PHARMACIST DOCUMENTED: ICD-10-PCS | Mod: CPTII,S$GLB,, | Performed by: INTERNAL MEDICINE

## 2023-12-22 PROCEDURE — 4010F PR ACE/ARB THEARPY RXD/TAKEN: ICD-10-PCS | Mod: CPTII,S$GLB,, | Performed by: INTERNAL MEDICINE

## 2023-12-22 PROCEDURE — 3074F PR MOST RECENT SYSTOLIC BLOOD PRESSURE < 130 MM HG: ICD-10-PCS | Mod: CPTII,S$GLB,, | Performed by: INTERNAL MEDICINE

## 2023-12-22 PROCEDURE — 93325 DOPPLER ECHO COLOR FLOW MAPG: CPT | Mod: 26,,, | Performed by: INTERNAL MEDICINE

## 2023-12-22 PROCEDURE — 3074F SYST BP LT 130 MM HG: CPT | Mod: CPTII,S$GLB,, | Performed by: INTERNAL MEDICINE

## 2023-12-22 PROCEDURE — 3044F PR MOST RECENT HEMOGLOBIN A1C LEVEL <7.0%: ICD-10-PCS | Mod: CPTII,S$GLB,, | Performed by: INTERNAL MEDICINE

## 2023-12-22 PROCEDURE — 99999 PR PBB SHADOW E&M-EST. PATIENT-LVL IV: CPT | Mod: PBBFAC,,, | Performed by: INTERNAL MEDICINE

## 2023-12-22 PROCEDURE — 93325 STRESS ECHO (CUPID ONLY): ICD-10-PCS | Mod: 26,,, | Performed by: INTERNAL MEDICINE

## 2023-12-22 PROCEDURE — 1159F MED LIST DOCD IN RCRD: CPT | Mod: CPTII,S$GLB,, | Performed by: INTERNAL MEDICINE

## 2023-12-22 PROCEDURE — 3008F PR BODY MASS INDEX (BMI) DOCUMENTED: ICD-10-PCS | Mod: CPTII,S$GLB,, | Performed by: INTERNAL MEDICINE

## 2023-12-22 PROCEDURE — 99214 OFFICE O/P EST MOD 30 MIN: CPT | Mod: S$GLB,,, | Performed by: INTERNAL MEDICINE

## 2023-12-22 PROCEDURE — 93320 STRESS ECHO (CUPID ONLY): ICD-10-PCS | Mod: 26,,, | Performed by: INTERNAL MEDICINE

## 2023-12-22 RX ORDER — ROSUVASTATIN CALCIUM 20 MG/1
20 TABLET, COATED ORAL DAILY
Qty: 90 TABLET | Refills: 3 | Status: SHIPPED | OUTPATIENT
Start: 2023-12-22 | End: 2023-12-22

## 2023-12-22 RX ORDER — ROSUVASTATIN CALCIUM 20 MG/1
20 TABLET, COATED ORAL DAILY
Qty: 90 EACH | Refills: 3 | Status: SHIPPED | OUTPATIENT
Start: 2023-12-22 | End: 2024-12-21

## 2023-12-22 NOTE — PROGRESS NOTES
Interventional Cardiology Clinic Note  Reason for Visit: Follow-up     HPI:     Patient is a 58 yo M with h/o CAD (STEMI (11/22/17) s/p pPCI of mLCx (3.5 x 16 CHRISTINA) [place], [place]), HTN and HLD here for follow up. He is known to have CAD and last cath showed non-obstructive mLAD and LCX disease. He had exercise stress echo done 2022 which showed excellent functional capacity of 13 METS, negative for ischemia. Last seen by Dr. Sarah in clinic in 2022.      Lifetime nonsmoker, drinks alcohol occasionally. Denies illicit drug use. Pt stopped taking his statin given muscle pain. Not on repatha. Pt doing well and denies chest pain, SOB or any other issus at this time      12/22/23 Stress ECHO     Left Ventricle: The left ventricle is normal in size. Ventricular mass is normal. Normal wall thickness. Normal wall motion. There is normal systolic function with a visually estimated ejection fraction of 55 - 60%. There is normal diastolic function.    Right Ventricle: Normal right ventricular cavity size. Wall thickness is normal. Right ventricle wall motion  is normal. Systolic function is normal.    Pulmonary Artery: The estimated pulmonary artery systolic pressure is 25 mmHg.    IVC/SVC: Normal venous pressure at 3 mmHg.    Stress Protocol: The patient was stressed using an exercise protocol. The patient exercised for 8 minutes  seconds on a high ramp protocol, corresponding to a functional capacity of 13 METS, achieving a peak heart rate of 144 bpm, which is 89 % of the age predicted maximum heart rate. Their exercise capacity was average. The patient reported no symptoms during the stress test. The test was stopped because the patient experienced leg fatigue.    ECG Conclusion: The ECG portion of the study is negative for ischemia.    Post-stress Impression: The study is negative with no echocardiographic evidence of stress induced ischemia.         ROS:    Constitution: Negative for fever,  chills, weight loss or gain.   HENT: Negative for sore throat, rhinorrhea, or headache.  Eyes: Negative for blurred or double vision.   Cardiovascular: See above  Pulmonary: Negative for SOB   Gastrointestinal: Negative for abdominal pain, nausea, vomiting, or diarrhea.   : Negative for dysuria.   Neurological: Negative for focal weakness or sensory changes.  PMH:   No past medical history on file.  No past surgical history on file.  Allergies:   Review of patient's allergies indicates:  No Known Allergies  Medications:     Current Outpatient Medications on File Prior to Visit   Medication Sig Dispense Refill    aspirin 81 MG Chew Take 81 mg by mouth.      atorvastatin (LIPITOR) 80 MG tablet Take 80 mg by mouth.      clopidogrel (PLAVIX) 75 mg tablet       lisinopril (PRINIVIL,ZESTRIL) 5 MG tablet       MINERAL OIL HEAVY MISC 2 tablets by Misc.(Non-Drug; Combo Route) route every evening.      multivitamin capsule Take 1 capsule by mouth once daily.      pantoprazole (PROTONIX) 40 MG tablet       saw/py/net/pumpk/beta/ly/Zn/Cu (PROSTATE CONTROL ORAL) Take by mouth.      valACYclovir (VALTREX) 500 MG tablet Take 500 mg by mouth 3 (three) times daily.  0     Current Facility-Administered Medications on File Prior to Visit   Medication Dose Route Frequency Provider Last Rate Last Admin    evolocumab PnIj   Subcutaneous 1 time in Clinic/HOD Richardson Murguia MD         Social History:     Social History     Tobacco Use    Smoking status: Never    Smokeless tobacco: Current     Types: Chew   Substance Use Topics    Alcohol use: No     Family History:     Family History   Problem Relation Age of Onset    Heart attack Father      Physical Exam:   There were no vitals taken for this visit.   Wt Readings from Last 4 Encounters:   12/20/22 74.8 kg (165 lb)   12/20/22 72.7 kg (160 lb 4.4 oz)   10/15/19 74.8 kg (164 lb 14.5 oz)   08/07/18 73.9 kg (162 lb 14.7 oz)         Constitutional: No distress, conversant  HEENT: Sclera  "anicteric, PERRLA, EOMI  Neck: No JVD, no masses, good movement  CV: RRR, S1 and S2 normal, no additional heart sounds or murmurs. Pulses 2+ and equal bilaterally in radial arteries, Matias's normal on right. Distal pulses are 2+ and equal in the femoral, DP and PT areas bilaterally  Pulm: Clear to auscultation bilaterally with symmetrical expansion. Chest wall palpated for reproduction of pain symptoms, and no pain was able to be produced on palpation or resistance exercises  GI: Abdomen soft, non-tender, good bowel sounds  Extremities: Both extremities intact and grossly normal, skin is warm, no edema noted  Skin: No ecchymosis, erythema, or ulcers  Psych: AOx3, appropriate affect  Neuro: CNII-XII intact, no focal deficits      Labs:     Lab Results   Component Value Date     12/22/2023    K 4.2 12/22/2023     12/22/2023    CO2 31 (H) 12/22/2023    BUN 11 12/22/2023    CREATININE 1.2 12/22/2023    ANIONGAP 9 12/22/2023     Lab Results   Component Value Date    HGBA1C 5.3 12/22/2023     No results found for: "BNP", "BNPTRIAGEBLO" Lab Results   Component Value Date    WBC 6.63 12/22/2023    HGB 16.5 12/22/2023    HCT 47.9 12/22/2023     12/22/2023    GRAN 3.7 12/22/2023    GRAN 55.0 12/22/2023     Lab Results   Component Value Date    CHOL 266 (H) 12/22/2023    HDL 36 (L) 12/22/2023    LDLCALC 208.8 (H) 12/22/2023    TRIG 106 12/22/2023          Imaging:     EF   Date Value Ref Range Status   12/20/2022 52 % Final     Stress TTE:   12/20/22   The test was stopped because the patient experienced atypical leg pain.  Intermediate central venous pressure (8 mmHg).  The estimated PA systolic pressure is 23 mmHg.  The left ventricle is normal in size with low normal systolic function.  The estimated ejection fraction is 52%.  Normal left ventricular diastolic function.  Normal right ventricular size with normal right ventricular systolic function.  During stress, the following significant arrhythmias " were observed: rare PVCs.  The patient's exercise capacity was normal.  Mild left atrial enlargement.  Mild right atrial enlargement.  The ECG portion of this study is negative for myocardial ischemia.  The stress echo portion of this study is negative for myocardial ischemia.    Plan:     1. Coronary artery disease involving native coronary artery of native heart without angina pectoris    2. Hyperlipidemia, unspecified hyperlipidemia type    -Pt on ASA. Prescribed atorvastatin and reptha but taking neither. Stopped atorva given muscle pain   - on today's labs  -Stress ECHO normal   -Will start Crestor 20 mg daily. Pt told to call us if he develops muscle pain. Will check Lipid panel in 3 months and likely add Zetia at that time     3. Hypertension, unspecified type  On lisinopril 10 mg daily. Will continue at this time     Keep a home BP diary and bring to next visit  Discussed mediterranean diet  Discussed exercise therapy based on 150-min per week AHA recommendations for moderate intensity exercise/75 min for high-intensity exercise    Signed:  Tobi Cox MD  Cardiology Fellow  Pager - [phone]  [facility]  12/22/2023 10:37 AM   I have seen the patient, reviewed the Fellow's history and physical, assessment and plan. I have personally interviewed and examined the patient and agree with the findings.     LUIS Sarah MD